# Patient Record
Sex: MALE | URBAN - METROPOLITAN AREA
[De-identification: names, ages, dates, MRNs, and addresses within clinical notes are randomized per-mention and may not be internally consistent; named-entity substitution may affect disease eponyms.]

---

## 2021-06-03 ENCOUNTER — OFFICE VISIT (OUTPATIENT)
Dept: URBAN - METROPOLITAN AREA CLINIC 48 | Facility: CLINIC | Age: 56
End: 2021-06-03
Payer: COMMERCIAL

## 2021-06-03 DIAGNOSIS — H53.453 OTHER LOCALIZED VISUAL FIELD DEFECT, BILATERAL: Primary | ICD-10-CM

## 2021-06-03 PROCEDURE — 99204 OFFICE O/P NEW MOD 45 MIN: CPT | Performed by: OPTOMETRIST

## 2021-06-03 PROCEDURE — 92134 CPTRZ OPH DX IMG PST SGM RTA: CPT | Performed by: OPTOMETRIST

## 2021-06-03 ASSESSMENT — INTRAOCULAR PRESSURE
OS: 19
OD: 20

## 2021-06-03 NOTE — IMPRESSION/PLAN
Impression: Other localized visual field defect, bilateral: H53.453. Secondary to stroke, small rectangular felid just right of central. Plan: Discussed with patient, Patient may return to work and drive as long as patient feels comfortable driving.  

RTC 2-3 months for follow up with [facility]2c

## 2023-04-06 ENCOUNTER — OFFICE VISIT (OUTPATIENT)
Dept: URBAN - METROPOLITAN AREA CLINIC 48 | Facility: CLINIC | Age: 58
End: 2023-04-06
Payer: COMMERCIAL

## 2023-04-06 DIAGNOSIS — H53.2 DIPLOPIA: Primary | ICD-10-CM

## 2023-04-06 PROCEDURE — 99203 OFFICE O/P NEW LOW 30 MIN: CPT | Performed by: STUDENT IN AN ORGANIZED HEALTH CARE EDUCATION/TRAINING PROGRAM

## 2023-04-06 ASSESSMENT — INTRAOCULAR PRESSURE
OS: 18
OD: 19

## 2023-04-06 NOTE — IMPRESSION/PLAN
Impression: Diplopia: H53.2. Plan: Discussed nature of dx with patient. 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 Advised patient if recurrence to return to clinic prior to next exam. 
Okay to obtain new mrx with preferred optometrist if patient desires. 

RTC 1 month for strab check (long exam)